# Patient Record
Sex: MALE | Race: WHITE | URBAN - METROPOLITAN AREA
[De-identification: names, ages, dates, MRNs, and addresses within clinical notes are randomized per-mention and may not be internally consistent; named-entity substitution may affect disease eponyms.]

---
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
## 2021-01-27 ENCOUNTER — TELEPHONE (OUTPATIENT)
Dept: FAMILY MEDICINE CLINIC | Facility: CLINIC | Age: 21
End: 2021-01-27

## 2021-01-27 NOTE — TELEPHONE ENCOUNTER
Patient states for the past week and a half he has been experiencing constant pain in his left middle back. No injuries that patient can remember.  After 4-5 days the pain started to radiate around his back into the left side of chest/abdomen under his ribs

## 2021-01-27 NOTE — TELEPHONE ENCOUNTER
Having right side back pain which is radiating to the front. Having abdominal pain on right and left side.

## 2021-09-20 ENCOUNTER — TELEPHONE (OUTPATIENT)
Dept: FAMILY MEDICINE CLINIC | Facility: CLINIC | Age: 21
End: 2021-09-20

## 2021-09-20 NOTE — TELEPHONE ENCOUNTER
Patient states that he sees Dr. Mauro Person, a psychiatrist, for his ADHD medications. Patient states that in order to have ADD medications prescribed, Dr. Debora Elizondo is asking for a letter from patients PCP that states his heart conditions are stable.

## 2021-09-20 NOTE — TELEPHONE ENCOUNTER
Patient informed of the below. 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 Patient is able to be seen there on Oct 4 and was wanting to be seen here within the next day or two.  Since no open

## 2021-09-20 NOTE — TELEPHONE ENCOUNTER
Needs letter for Dr. Tania Jaime, in [place], for ADHD medication. Please give him a call back  No future appointments.

## 2021-09-20 NOTE — TELEPHONE ENCOUNTER
----- Message from Padmini Garcia sent at 9/20/2021 11:00 AM CDT -----  Regarding: [facility]    [phone]

## 2022-10-26 NOTE — TELEPHONE ENCOUNTER
Reviewed chart. Patient has not been seen by Dr. Vivian Person since 2018. Left message for patient to call office back. ADD/ADHD not listed in patient's problem list/medical history.      Will clarify what patient is needing but patient will need appointm room air

## (undated) NOTE — MR AVS SNAPSHOT
[facility] [address] [place] [address],  [address]  [phone]               Thank you for choosing us for your health care visit with Olivia Garza MD.  We are glad to serve you and happy to provide you with this summary of yo [employer] access allows you to view health information for your child from their recent   visit, view other health information and more. To sign up or find more information on getting   Proxy Access to your child’s [employer] and [employer] go to [email or web address] family routines to help everyone lead healthier active lives.  Try:  o Eating breakfast everyday  o Eating low-fat dairy products like yogurt, milk, and cheese  o Regularly eating meals together as a family  o Limiting fast food, take out food, and eating o

## (undated) NOTE — LETTER
Date: 6/18/2018    Patient Name: Elisa Miller          To Whom it may concern: This letter has been written at the patient's request. The above patient was seen at the [facility] for treatment of a medical condition.     This patien

## (undated) NOTE — LETTER
Date: 11/12/2018    Patient Name: Yudi Hooks          To Whom it may concern: This letter has been written at the patient's request. The above patient was seen at the [facility] for treatment of a medical condition.     This patie